# Patient Record
Sex: FEMALE | URBAN - METROPOLITAN AREA
[De-identification: names, ages, dates, MRNs, and addresses within clinical notes are randomized per-mention and may not be internally consistent; named-entity substitution may affect disease eponyms.]

---

## 2018-11-26 ENCOUNTER — OFFICE (OUTPATIENT)
Dept: URBAN - METROPOLITAN AREA CLINIC 33 | Facility: CLINIC | Age: 38
End: 2018-11-26
Payer: MEDICAID

## 2018-11-26 VITALS
DIASTOLIC BLOOD PRESSURE: 73 MMHG | HEIGHT: 63 IN | TEMPERATURE: 97.3 F | HEART RATE: 64 BPM | WEIGHT: 164 LBS | SYSTOLIC BLOOD PRESSURE: 99 MMHG

## 2018-11-26 DIAGNOSIS — K92.2 GASTROINTESTINAL HEMORRHAGE, UNSPECIFIED: ICD-10-CM

## 2018-11-26 DIAGNOSIS — Q21.0 VENTRICULAR SEPTAL DEFECT: ICD-10-CM

## 2018-11-26 DIAGNOSIS — K59.09 OTHER CONSTIPATION: ICD-10-CM

## 2018-11-26 PROCEDURE — 99204 OFFICE O/P NEW MOD 45 MIN: CPT

## 2018-11-26 RX ORDER — PSYLLIUM SEED
PACKET (EA) ORAL
Qty: 1 | Refills: 3 | Status: COMPLETED
Start: 2018-11-26 | End: 2021-11-15

## 2021-07-21 ENCOUNTER — OFFICE (OUTPATIENT)
Dept: URBAN - METROPOLITAN AREA CLINIC 102 | Facility: CLINIC | Age: 41
End: 2021-07-21

## 2021-07-21 PROCEDURE — 00038: CPT | Performed by: INTERNAL MEDICINE

## 2021-09-22 ENCOUNTER — ON CAMPUS - OUTPATIENT (OUTPATIENT)
Dept: URBAN - METROPOLITAN AREA HOSPITAL 16 | Facility: HOSPITAL | Age: 41
End: 2021-09-22
Payer: MEDICAID

## 2021-09-22 DIAGNOSIS — K59.09 OTHER CONSTIPATION: ICD-10-CM

## 2021-09-22 DIAGNOSIS — K63.5 POLYP OF COLON: ICD-10-CM

## 2021-09-22 DIAGNOSIS — K92.2 GASTROINTESTINAL HEMORRHAGE, UNSPECIFIED: ICD-10-CM

## 2021-09-22 PROCEDURE — 45380 COLONOSCOPY AND BIOPSY: CPT | Performed by: INTERNAL MEDICINE

## 2021-11-15 ENCOUNTER — TELEHEALTH PROVIDED OTHER THAN IN PATIENT'S HOME (OUTPATIENT)
Dept: URBAN - METROPOLITAN AREA TELEHEALTH 3 | Facility: TELEHEALTH | Age: 41
End: 2021-11-15
Payer: MEDICAID

## 2021-11-15 VITALS — HEIGHT: 63 IN | WEIGHT: 167 LBS

## 2021-11-15 DIAGNOSIS — K59.09 OTHER CONSTIPATION: ICD-10-CM

## 2021-11-15 DIAGNOSIS — R14.0 ABDOMINAL DISTENSION (GASEOUS): ICD-10-CM

## 2021-11-15 PROCEDURE — 99213 OFFICE O/P EST LOW 20 MIN: CPT | Mod: 95 | Performed by: INTERNAL MEDICINE

## 2021-11-15 RX ORDER — POLYETHYLENE GLYCOL 3350 17 G/17G
POWDER, FOR SOLUTION ORAL
Qty: 1 | Refills: 0 | Status: ACTIVE
Start: 2021-11-15

## 2021-11-15 NOTE — SERVICEHPINOTES
PATIENT VERIFIED BY DATE OF BIRTH AND NAME. Patient has been consented for this telecommunication visit.  She has a tendency towards constipation where she can go several days without having a bowel movement. she notes bloating especially with fruits and vegetables and bread.

## 2023-04-24 ENCOUNTER — TELEHEALTH PROVIDED IN PATIENT'S HOME (OUTPATIENT)
Dept: URBAN - METROPOLITAN AREA TELEHEALTH 7 | Facility: TELEHEALTH | Age: 43
End: 2023-04-24
Payer: MEDICAID

## 2023-04-24 VITALS — WEIGHT: 159 LBS | HEIGHT: 63 IN

## 2023-04-24 DIAGNOSIS — K59.09 OTHER CONSTIPATION: ICD-10-CM

## 2023-04-24 DIAGNOSIS — R14.0 ABDOMINAL DISTENSION (GASEOUS): ICD-10-CM

## 2023-04-24 PROCEDURE — 99214 OFFICE O/P EST MOD 30 MIN: CPT | Mod: 95 | Performed by: NURSE PRACTITIONER

## 2023-04-24 RX ORDER — LINACLOTIDE 145 UG/1
CAPSULE, GELATIN COATED ORAL
Qty: 30 | Refills: 5 | Status: ACTIVE
Start: 2023-04-24

## 2023-04-24 NOTE — SERVICEHPINOTES
PATIENT VERIFIED BY DATE OF BIRTH AND NAME. Patient has been consented for this telecommunication visit. Still having constipation, a lot. Tried miralax which did not help, added senna and stool softener. Moves bowel every 4 days after taking laxatives. Hemorrhoid flare up with constipation. It feels like the something is in the way, and she has to manually help it come out. + Bloating. Dairy and gluten seem to worsen the bloating.   
br
br All 10 point review of systems have been reviewed as per HPI and otherwise negative.   [ROS Wording]